# Patient Record
Sex: MALE | Race: OTHER | NOT HISPANIC OR LATINO | Employment: UNEMPLOYED | URBAN - METROPOLITAN AREA
[De-identification: names, ages, dates, MRNs, and addresses within clinical notes are randomized per-mention and may not be internally consistent; named-entity substitution may affect disease eponyms.]

---

## 2024-08-20 ENCOUNTER — HOSPITAL ENCOUNTER (EMERGENCY)
Facility: HOSPITAL | Age: 54
Discharge: HOME | End: 2024-08-20
Attending: EMERGENCY MEDICINE

## 2024-08-20 ENCOUNTER — OCCUPATIONAL HEALTH (OUTPATIENT)
Dept: URGENT CARE | Facility: CLINIC | Age: 54
End: 2024-08-20

## 2024-08-20 VITALS
HEART RATE: 73 BPM | SYSTOLIC BLOOD PRESSURE: 189 MMHG | OXYGEN SATURATION: 98 % | WEIGHT: 196 LBS | DIASTOLIC BLOOD PRESSURE: 102 MMHG | RESPIRATION RATE: 22 BRPM | BODY MASS INDEX: 28.06 KG/M2 | HEIGHT: 70 IN | TEMPERATURE: 98 F

## 2024-08-20 DIAGNOSIS — Z02.1 PRE-EMPLOYMENT EXAMINATION: Primary | ICD-10-CM

## 2024-08-20 DIAGNOSIS — I10 SEVERE UNCONTROLLED HYPERTENSION: Primary | ICD-10-CM

## 2024-08-20 LAB
ANION GAP SERPL CALC-SCNC: 12 MMOL/L (ref 8–16)
BASOPHILS # BLD AUTO: 0.05 K/UL (ref 0–0.2)
BASOPHILS NFR BLD: 0.6 % (ref 0–1.9)
BUN SERPL-MCNC: 13 MG/DL (ref 6–20)
CALCIUM SERPL-MCNC: 9.8 MG/DL (ref 8.7–10.5)
CHLORIDE SERPL-SCNC: 110 MMOL/L (ref 95–110)
CO2 SERPL-SCNC: 21 MMOL/L (ref 23–29)
CREAT SERPL-MCNC: 1.2 MG/DL (ref 0.5–1.4)
DIFFERENTIAL METHOD BLD: NORMAL
EOSINOPHIL # BLD AUTO: 0.2 K/UL (ref 0–0.5)
EOSINOPHIL NFR BLD: 2.6 % (ref 0–8)
ERYTHROCYTE [DISTWIDTH] IN BLOOD BY AUTOMATED COUNT: 11.9 % (ref 11.5–14.5)
EST. GFR  (NO RACE VARIABLE): >60 ML/MIN/1.73 M^2
GLUCOSE SERPL-MCNC: 98 MG/DL (ref 70–110)
HCT VFR BLD AUTO: 49.3 % (ref 40–54)
HGB BLD-MCNC: 16.7 G/DL (ref 14–18)
IMM GRANULOCYTES # BLD AUTO: 0.02 K/UL (ref 0–0.04)
IMM GRANULOCYTES NFR BLD AUTO: 0.2 % (ref 0–0.5)
LYMPHOCYTES # BLD AUTO: 1.7 K/UL (ref 1–4.8)
LYMPHOCYTES NFR BLD: 20.1 % (ref 18–48)
MCH RBC QN AUTO: 30.7 PG (ref 27–31)
MCHC RBC AUTO-ENTMCNC: 33.9 G/DL (ref 32–36)
MCV RBC AUTO: 91 FL (ref 82–98)
MONOCYTES # BLD AUTO: 0.4 K/UL (ref 0.3–1)
MONOCYTES NFR BLD: 4.4 % (ref 4–15)
NEUTROPHILS # BLD AUTO: 6.1 K/UL (ref 1.8–7.7)
NEUTROPHILS NFR BLD: 72.1 % (ref 38–73)
NRBC BLD-RTO: 0 /100 WBC
OHS QRS DURATION: 96 MS
OHS QTC CALCULATION: 457 MS
PLATELET # BLD AUTO: 193 K/UL (ref 150–450)
PMV BLD AUTO: 10.8 FL (ref 9.2–12.9)
POTASSIUM SERPL-SCNC: 3.7 MMOL/L (ref 3.5–5.1)
RBC # BLD AUTO: 5.44 M/UL (ref 4.6–6.2)
SODIUM SERPL-SCNC: 143 MMOL/L (ref 136–145)
WBC # BLD AUTO: 8.41 K/UL (ref 3.9–12.7)

## 2024-08-20 PROCEDURE — 63600175 PHARM REV CODE 636 W HCPCS: Performed by: EMERGENCY MEDICINE

## 2024-08-20 PROCEDURE — 99499 UNLISTED E&M SERVICE: CPT | Mod: S$GLB,,, | Performed by: NURSE PRACTITIONER

## 2024-08-20 PROCEDURE — 25000003 PHARM REV CODE 250: Performed by: EMERGENCY MEDICINE

## 2024-08-20 PROCEDURE — 93010 ELECTROCARDIOGRAM REPORT: CPT | Mod: ,,, | Performed by: INTERNAL MEDICINE

## 2024-08-20 PROCEDURE — 85025 COMPLETE CBC W/AUTO DIFF WBC: CPT | Performed by: EMERGENCY MEDICINE

## 2024-08-20 PROCEDURE — 99284 EMERGENCY DEPT VISIT MOD MDM: CPT | Mod: 25

## 2024-08-20 PROCEDURE — 96375 TX/PRO/DX INJ NEW DRUG ADDON: CPT

## 2024-08-20 PROCEDURE — 93005 ELECTROCARDIOGRAM TRACING: CPT

## 2024-08-20 PROCEDURE — 96374 THER/PROPH/DIAG INJ IV PUSH: CPT

## 2024-08-20 PROCEDURE — 80048 BASIC METABOLIC PNL TOTAL CA: CPT | Performed by: EMERGENCY MEDICINE

## 2024-08-20 RX ORDER — NAPROXEN SODIUM 220 MG/1
81 TABLET, FILM COATED ORAL DAILY
COMMUNITY

## 2024-08-20 RX ORDER — LABETALOL HYDROCHLORIDE 5 MG/ML
10 INJECTION, SOLUTION INTRAVENOUS
Status: COMPLETED | OUTPATIENT
Start: 2024-08-20 | End: 2024-08-20

## 2024-08-20 RX ORDER — HYDRALAZINE HYDROCHLORIDE 20 MG/ML
10 INJECTION INTRAMUSCULAR; INTRAVENOUS
Status: COMPLETED | OUTPATIENT
Start: 2024-08-20 | End: 2024-08-20

## 2024-08-20 RX ORDER — AMLODIPINE BESYLATE 2.5 MG/1
5 TABLET ORAL
Status: COMPLETED | OUTPATIENT
Start: 2024-08-20 | End: 2024-08-20

## 2024-08-20 RX ORDER — AMLODIPINE BESYLATE 5 MG/1
5 TABLET ORAL DAILY
Qty: 30 TABLET | Refills: 0 | Status: SHIPPED | OUTPATIENT
Start: 2024-08-20 | End: 2024-09-19

## 2024-08-20 RX ADMIN — LABETALOL HYDROCHLORIDE 10 MG: 5 INJECTION INTRAVENOUS at 01:08

## 2024-08-20 RX ADMIN — AMLODIPINE BESYLATE 5 MG: 2.5 TABLET ORAL at 02:08

## 2024-08-20 RX ADMIN — HYDRALAZINE HYDROCHLORIDE 10 MG: 20 INJECTION, SOLUTION INTRAMUSCULAR; INTRAVENOUS at 01:08

## 2024-08-20 NOTE — PROGRESS NOTES
54yo male here for pre-employment physical. His initial BP was 225/118. This was repeated Manually and was 224/118. Patient reports that he is completely asymptomatic. He was advised to go to ER now for further evaluation and treatment of Hypertensive Crisis. His wife is here with him and will drive him.

## 2024-08-25 NOTE — ED PROVIDER NOTES
History     Chief Complaint   Patient presents with    Hypertension     At  for work physical & b/p 225/115. Girlfriend states he was previously on b/p meds years ago but not recently     HPI:  Yusra Miller is a 53 y.o. male with PMH as below who presents to the [facility] emergency department for evaluation of asymptomatic markedly elevated blood pressure. Patient states he used to be on BP medication but has not taken it for same time. He was at a work physical exam and had /115 mmHg.       PCP: No, Primary Doctor    Review of patient's allergies indicates:  No Known Allergies   History reviewed. No pertinent past medical history.  History reviewed. No pertinent surgical history.    No family history on file.  Social History     Tobacco Use    Smoking status: Never    Smokeless tobacco: Current     Types: Snuff   Substance and Sexual Activity    Alcohol use: Yes     Comment: socially    Drug use: Never    Sexual activity: Not on file      Review of Systems     Review of Systems   Constitutional: Negative.    HENT: Negative.     Eyes: Negative.    Respiratory: Negative.     Cardiovascular: Negative.    Gastrointestinal: Negative.    Endocrine: Negative.    Genitourinary: Negative.    Musculoskeletal: Negative.    Skin: Negative.    Allergic/Immunologic: Negative.    Neurological: Negative.  Negative for weakness, numbness and headaches.   Hematological: Negative.    Psychiatric/Behavioral: Negative.     All other systems reviewed and are negative.       Physical Exam     Initial Vitals [08/20/24 1224]   BP Pulse Resp Temp SpO2   (!) 245/127 77 18 97.9 °F (36.6 °C) 96 %      MAP       --          Nursing notes and vital signs reviewed.  Constitutional: Patient is in no acute distress.   Head: Normocephalic. Atraumatic.   Eyes:  Conjunctivae are not pale. No scleral icterus.   ENT: Mucous membranes moist.   Neck: Supple.   Cardiovascular: Regular rate. Regular rhythm. No murmurs, rubs, or gallops  "  Pulmonary: No respiratory distress. Clear to auscultation bilaterally   Abdominal: Non-distended.   Musculoskeletal: Moves all extremities. No obvious deformities.   Skin: Warm and dry.   Neurological:  Alert, awake, and appropriate. Normal speech. No acute lateralizing neurologic deficits appreciated.   Psychiatric: Normal affect.       ED Course   Procedures  Vitals:    08/20/24 1224 08/20/24 1300 08/20/24 1301 08/20/24 1315   BP: (!) 245/127 (!) 213/124 (!) 211/123 (!) 189/96   Pulse: 77 66  61   Resp: 18 16  14   Temp: 97.9 °F (36.6 °C)      TempSrc: Oral      SpO2: 96% 97%  97%   Weight: 88.9 kg (195 lb 15.8 oz)      Height: 5' 9.5" (1.765 m)       08/20/24 1330 08/20/24 1345 08/20/24 1357 08/20/24 1413   BP: (!) 192/107 (!) 207/108 (!) 215/106 (!) 207/107   Pulse: 63 (!) 57     Resp: 18 14     Temp:       TempSrc:       SpO2: 96% 97%     Weight:       Height:        08/20/24 1415   BP: (!) 189/102   Pulse: 73   Resp: (!) 22   Temp:    TempSrc:    SpO2: 98%   Weight:    Height:      Lab Results Interpreted as Abnormal:  Labs Reviewed   BASIC METABOLIC PANEL - Abnormal       Result Value    Sodium 143      Potassium 3.7      Chloride 110      CO2 21 (*)     Glucose 98      BUN 13      Creatinine 1.2      Calcium 9.8      Anion Gap 12      eGFR >60.0     CBC W/ AUTO DIFFERENTIAL    WBC 8.41      RBC 5.44      Hemoglobin 16.7      Hematocrit 49.3      MCV 91      MCH 30.7      MCHC 33.9      RDW 11.9      Platelets 193      MPV 10.8      Immature Granulocytes 0.2      Gran # (ANC) 6.1      Immature Grans (Abs) 0.02      Lymph # 1.7      Mono # 0.4      Eos # 0.2      Baso # 0.05      nRBC 0      Gran % 72.1      Lymph % 20.1      Mono % 4.4      Eosinophil % 2.6      Basophil % 0.6      Differential Method Automated        All Lab Results:  Results for orders placed or performed during the hospital encounter of 08/20/24   CBC auto differential   Result Value Ref Range    WBC 8.41 3.90 - 12.70 K/uL    RBC 5.44 " 4.60 - 6.20 M/uL    Hemoglobin 16.7 14.0 - 18.0 g/dL    Hematocrit 49.3 40.0 - 54.0 %    MCV 91 82 - 98 fL    MCH 30.7 27.0 - 31.0 pg    MCHC 33.9 32.0 - 36.0 g/dL    RDW 11.9 11.5 - 14.5 %    Platelets 193 150 - 450 K/uL    MPV 10.8 9.2 - 12.9 fL    Immature Granulocytes 0.2 0.0 - 0.5 %    Gran # (ANC) 6.1 1.8 - 7.7 K/uL    Immature Grans (Abs) 0.02 0.00 - 0.04 K/uL    Lymph # 1.7 1.0 - 4.8 K/uL    Mono # 0.4 0.3 - 1.0 K/uL    Eos # 0.2 0.0 - 0.5 K/uL    Baso # 0.05 0.00 - 0.20 K/uL    nRBC 0 0 /100 WBC    Gran % 72.1 38.0 - 73.0 %    Lymph % 20.1 18.0 - 48.0 %    Mono % 4.4 4.0 - 15.0 %    Eosinophil % 2.6 0.0 - 8.0 %    Basophil % 0.6 0.0 - 1.9 %    Differential Method Automated    Basic metabolic panel   Result Value Ref Range    Sodium 143 136 - 145 mmol/L    Potassium 3.7 3.5 - 5.1 mmol/L    Chloride 110 95 - 110 mmol/L    CO2 21 (L) 23 - 29 mmol/L    Glucose 98 70 - 110 mg/dL    BUN 13 6 - 20 mg/dL    Creatinine 1.2 0.5 - 1.4 mg/dL    Calcium 9.8 8.7 - 10.5 mg/dL    Anion Gap 12 8 - 16 mmol/L    eGFR >60.0 >60 mL/min/1.73 m^2   EKG 12-lead   Result Value Ref Range    QRS Duration 96 ms    [facility] QTC Calculation 457 ms     Imaging Results    None        The emergency physician reviewed the vital signs / test results outlined above.     ED Discussion      Patient's evaluation in the ED does not suggest any emergent or life-threatening medical conditions requiring immediate intervention beyond what was provided in the ED, and I believe patient is safe for discharge. Regardless, an unremarkable evaluation in the ED does not preclude the development or presence of a serious or life-threatening condition. As such, patient was given return instructions for any change or worsening of symptoms.       ED Medication(s) Administered:  Medications   labetaloL injection 10 mg (10 mg Intravenous Given 8/20/24 1301)   hydrALAZINE injection 10 mg (10 mg Intravenous Given 8/20/24 1357)   amLODIPine tablet 5 mg (5 mg Oral Given  8/20/24 1413)       Prescription Management: I performed a review of the patient's current Rx medication list as input by nursing staff.    Discharge Medication List as of 8/20/2024  1:54 PM        START taking these medications    Details   amLODIPine (NORVASC) 5 MG tablet Take 1 tablet (5 mg total) by mouth once daily., Starting Tue 8/20/2024, Until Thu 9/19/2024, Normal           CONTINUE these medications which have NOT CHANGED    Details   aspirin 81 MG Chew Take 81 mg by mouth once daily., Historical Med      diphenhydramine HCl (ALLERGY MEDICATION ORAL) Take by mouth., Historical Med      IBUPROFEN ORAL Take by mouth as needed., Historical Med               Follow-up Information       Schedule an appointment as soon as possible for a visit  with your primary care physician.               [facility] Emergency Dept.    Specialty: Emergency Medicine  Why: As needed, If symptoms worsen  Contact information:  [address] [address] [phone]                          Clinical Impression       ICD-10-CM ICD-9-CM   1. Severe uncontrolled hypertension  I10 401.9      ED Disposition Condition    Discharge Stable             Naresh Ireland MD  08/25/24 1503

## 2024-09-06 ENCOUNTER — OCCUPATIONAL HEALTH (OUTPATIENT)
Dept: URGENT CARE | Facility: CLINIC | Age: 54
End: 2024-09-06

## 2024-09-06 DIAGNOSIS — Z02.1 PRE-EMPLOYMENT DRUG SCREENING: Primary | ICD-10-CM

## 2024-09-06 LAB
CTP QC/QA: YES
POC 10 PANEL DRUG SCREEN: NEGATIVE

## 2024-09-06 RX ORDER — LISINOPRIL 20 MG/1
20 TABLET ORAL
COMMUNITY
Start: 2024-09-05

## 2024-09-06 NOTE — PROGRESS NOTES
Subjective:       Patient ID: Yusra Miller is a 53 y.o. male.    Vitals:  vitals were not taken for this visit.     Chief Complaint: No chief complaint on file.    ELVIS TYSON        Objective:      Physical Exam      Past medical history and current medications reviewed.       Assessment:           No diagnosis found.          Plan:         There are no diagnoses linked to this encounter.         There are no Patient Instructions on file for this visit.